# Patient Record
Sex: MALE | Race: WHITE
[De-identification: names, ages, dates, MRNs, and addresses within clinical notes are randomized per-mention and may not be internally consistent; named-entity substitution may affect disease eponyms.]

---

## 2021-02-14 ENCOUNTER — TRANSCRIPTION ENCOUNTER (OUTPATIENT)
Age: 31
End: 2021-02-14

## 2021-02-23 ENCOUNTER — TRANSCRIPTION ENCOUNTER (OUTPATIENT)
Age: 31
End: 2021-02-23

## 2021-02-26 ENCOUNTER — TRANSCRIPTION ENCOUNTER (OUTPATIENT)
Age: 31
End: 2021-02-26

## 2022-01-18 ENCOUNTER — TRANSCRIPTION ENCOUNTER (OUTPATIENT)
Age: 32
End: 2022-01-18

## 2023-11-13 ENCOUNTER — APPOINTMENT (OUTPATIENT)
Dept: ORTHOPEDIC SURGERY | Facility: CLINIC | Age: 33
End: 2023-11-13
Payer: OTHER MISCELLANEOUS

## 2023-11-13 VITALS — BODY MASS INDEX: 32.34 KG/M2 | HEIGHT: 73 IN | WEIGHT: 244 LBS

## 2023-11-13 VITALS — WEIGHT: 244 LBS | HEIGHT: 73 IN | BODY MASS INDEX: 32.34 KG/M2

## 2023-11-13 PROCEDURE — 73030 X-RAY EXAM OF SHOULDER: CPT | Mod: LT

## 2023-11-13 PROCEDURE — 99203 OFFICE O/P NEW LOW 30 MIN: CPT

## 2023-11-27 ENCOUNTER — OUTPATIENT (OUTPATIENT)
Dept: OUTPATIENT SERVICES | Facility: HOSPITAL | Age: 33
LOS: 1 days | End: 2023-11-27
Payer: COMMERCIAL

## 2023-11-27 ENCOUNTER — APPOINTMENT (OUTPATIENT)
Dept: MRI IMAGING | Facility: HOSPITAL | Age: 33
End: 2023-11-27
Payer: OTHER MISCELLANEOUS

## 2023-11-27 DIAGNOSIS — Z00.8 ENCOUNTER FOR OTHER GENERAL EXAMINATION: ICD-10-CM

## 2023-11-27 PROCEDURE — 73221 MRI JOINT UPR EXTREM W/O DYE: CPT

## 2023-11-27 PROCEDURE — 73221 MRI JOINT UPR EXTREM W/O DYE: CPT | Mod: 26,LT

## 2023-11-29 ENCOUNTER — APPOINTMENT (OUTPATIENT)
Dept: ORTHOPEDIC SURGERY | Facility: CLINIC | Age: 33
End: 2023-11-29
Payer: OTHER MISCELLANEOUS

## 2023-11-29 VITALS — HEART RATE: 106 BPM | SYSTOLIC BLOOD PRESSURE: 145 MMHG | DIASTOLIC BLOOD PRESSURE: 89 MMHG

## 2023-11-29 PROCEDURE — 99214 OFFICE O/P EST MOD 30 MIN: CPT

## 2023-11-30 ENCOUNTER — RX RENEWAL (OUTPATIENT)
Age: 33
End: 2023-11-30

## 2023-11-30 RX ORDER — DICLOFENAC SODIUM 75 MG/1
75 TABLET, DELAYED RELEASE ORAL TWICE DAILY
Qty: 30 | Refills: 0 | Status: ACTIVE | COMMUNITY
Start: 2023-11-13 | End: 1900-01-01

## 2023-12-19 ENCOUNTER — APPOINTMENT (OUTPATIENT)
Dept: ORTHOPEDIC SURGERY | Facility: CLINIC | Age: 33
End: 2023-12-19
Payer: OTHER MISCELLANEOUS

## 2023-12-19 DIAGNOSIS — M25.512 PAIN IN LEFT SHOULDER: ICD-10-CM

## 2023-12-19 PROCEDURE — 20610 DRAIN/INJ JOINT/BURSA W/O US: CPT | Mod: LT

## 2023-12-19 PROCEDURE — 99214 OFFICE O/P EST MOD 30 MIN: CPT | Mod: 25

## 2023-12-22 PROBLEM — M25.512 LEFT SHOULDER PAIN: Status: ACTIVE | Noted: 2023-11-15

## 2023-12-22 NOTE — PROCEDURE
[de-identified] : Injection: Left shoulder (Subacromial). Indication: Impingement/SLAP  A discussion was had with the patient regarding this procedure and all questions were answered. All risks, benefits and alternatives were discussed. These included but were not limited to bleeding, infection, and allergic reaction. Alcohol was used to clean the skin, and betadine was used to sterilize and prep the area in the posterior aspect of the left shoulder. Ethyl chloride spray was then used as a topical anesthetic. A 21-gauge needle was used to inject 4cc of 1% lidocaine and 1cc of 40mg/ml methylprednisolone into the left subacromial space. A sterile bandage was then applied. The patient tolerated the procedure well and there were no complications.

## 2023-12-22 NOTE — DISCUSSION/SUMMARY
[de-identified] : 32 y/o male with left shoulder injury.   Presents for follow-up of left shoulder injury. Clinically, patient has improved since the last visit. He has rmild residual posterior shoulder pain with certain activity.  Consistent with likely aggravation of underlying SLAP lesion.  We discussed that there is no significant internal derangement again, and improvement of symptoms does suggest that this is something that we will be able to manage. Injection therapy was provided for therapeutic and symptomatic relief.   Recommendations: Continue trial of PT, Rx given.  Return to work as able.  Ice/NSAIDs as needed  Followup as needed.

## 2023-12-22 NOTE — ADDENDUM
[FreeTextEntry1] : This note was written by Ambar Bajwa on 12/19/2023 acting solely as a scribe for Dr. Trung Watt.  All medical record entries made by the Scribe were at my, Dr. Trung Watt, direction and personally dictated by me on 12/19/2023. I have personally reviewed the chart and agree that the record accurately reflects my personal performance of the history, physical exam, assessment and plan.

## 2023-12-22 NOTE — PHYSICAL EXAM
[de-identified] : Left shoulder exam:   Inspection: No malalignment, No defects, No atrophy Skin: No masses, No lesions Neck: Negative Spurling, full ROM, no pain with ROM AROM: FF to 170, abduction to 90, ER to 70, IR to upper lumbar Painful arc ROM: Pain with IR Tenderness: No bicipital tenderness, +tenderness to greater tuberosity/RTC insertion, no anterior shoulder/lesser tuberosity tenderness Strength: 5/5 ER, 5/5 IR in adduction, 4+/5 supraspinatus testing, negative Breathitt's test AC joint: No TTP/pain with cross arm testing Biceps: Speed Negative, Yergason Negative Impingement test: negative Yadav, negative Neer Vasc: 2+ radial pulse Stability: Stable Neuro: AIN, PIN, Ulnar nerve intact to motor Sensation: Intact to light touch throughout Other findings: None.  [de-identified] : 3 views of left shoulder were obtained today, 11/13/2023, that show no acute fracture or dislocation. There is no glenohumeral and no AC joint degenerative change seen. Type II acromion. There is no significant malalignment. No significant other obvious osseous abnormality, otherwise unremarkable.   MRI left shoulder dated 11/27/2023 reviewed that shows a low-grade articular sided fraying/partial tear of rotator cuff. Superior labral dysfunction. Otherwise unremarkable. No high-grade internal derangement.  We independently reviewed and discussed in detail the images and the radiologic reports with the patient.

## 2023-12-22 NOTE — HISTORY OF PRESENT ILLNESS
[de-identified] : 33 year old RHD male presents today for follow up of left shoulder pain since 11/9/23. He has been attending PT since last visit with improvement. He still has difficulty hanging from pull up bar and external rotation. MRI left shoulder dated 11/27/2023 shows a low-grade articular sided fraying/partial tear of rotator cuff and chronic degenerative SLAP. Localizes pain to the lateral as well as posterior aspect of the shoulder. Diclofenac provided relief initially but no longer does. Denies numbness or tingling. Patient is a  for [employer], he slipped out of his truck and hit his shoulder to a metal bar.

## 2024-08-12 ENCOUNTER — APPOINTMENT (OUTPATIENT)
Dept: GASTROENTEROLOGY | Facility: CLINIC | Age: 34
End: 2024-08-12